# Patient Record
Sex: FEMALE | Race: WHITE | NOT HISPANIC OR LATINO | Employment: OTHER | URBAN - NONMETROPOLITAN AREA
[De-identification: names, ages, dates, MRNs, and addresses within clinical notes are randomized per-mention and may not be internally consistent; named-entity substitution may affect disease eponyms.]

---

## 2023-09-18 LAB
BASOPHILS (10*3/UL) IN BLOOD BY AUTOMATED COUNT: 0.05 X10E9/L (ref 0–0.1)
BASOPHILS/100 LEUKOCYTES IN BLOOD BY AUTOMATED COUNT: 1 % (ref 0–2)
EOSINOPHILS (10*3/UL) IN BLOOD BY AUTOMATED COUNT: 0.12 X10E9/L (ref 0–0.7)
EOSINOPHILS/100 LEUKOCYTES IN BLOOD BY AUTOMATED COUNT: 2.5 % (ref 0–6)
ERYTHROCYTE DISTRIBUTION WIDTH (RATIO) BY AUTOMATED COUNT: 12.2 % (ref 11.5–14.5)
ERYTHROCYTE MEAN CORPUSCULAR HEMOGLOBIN CONCENTRATION (G/DL) BY AUTOMATED: 32.6 G/DL (ref 32–36)
ERYTHROCYTE MEAN CORPUSCULAR VOLUME (FL) BY AUTOMATED COUNT: 88 FL (ref 80–100)
ERYTHROCYTES (10*6/UL) IN BLOOD BY AUTOMATED COUNT: 4.91 X10E12/L (ref 4–5.2)
HEMATOCRIT (%) IN BLOOD BY AUTOMATED COUNT: 43.2 % (ref 36–46)
HEMOGLOBIN (G/DL) IN BLOOD: 14.1 G/DL (ref 12–16)
IMMATURE GRANULOCYTES/100 LEUKOCYTES IN BLOOD BY AUTOMATED COUNT: 0.2 % (ref 0–0.9)
LEUKOCYTES (10*3/UL) IN BLOOD BY AUTOMATED COUNT: 4.8 X10E9/L (ref 4.4–11.3)
LYMPHOCYTES (10*3/UL) IN BLOOD BY AUTOMATED COUNT: 1.28 X10E9/L (ref 1.2–4.8)
LYMPHOCYTES/100 LEUKOCYTES IN BLOOD BY AUTOMATED COUNT: 26.4 % (ref 13–44)
MONOCYTES (10*3/UL) IN BLOOD BY AUTOMATED COUNT: 0.31 X10E9/L (ref 0.1–1)
MONOCYTES/100 LEUKOCYTES IN BLOOD BY AUTOMATED COUNT: 6.4 % (ref 2–10)
NEUTROPHILS (10*3/UL) IN BLOOD BY AUTOMATED COUNT: 3.07 X10E9/L (ref 1.2–7.7)
NEUTROPHILS/100 LEUKOCYTES IN BLOOD BY AUTOMATED COUNT: 63.5 % (ref 40–80)
PLATELETS (10*3/UL) IN BLOOD AUTOMATED COUNT: 169 X10E9/L (ref 150–450)

## 2023-09-19 LAB
COBALAMIN (VITAMIN B12) (PG/ML) IN SER/PLAS: 367 PG/ML (ref 211–911)
FOLATE (NG/ML) IN SER/PLAS: 14.4 NG/ML

## 2023-12-07 PROBLEM — D22.5 MELANOCYTIC NEVI OF TRUNK: Status: ACTIVE | Noted: 2019-12-04

## 2023-12-07 PROBLEM — S62.102A LEFT WRIST FRACTURE: Status: ACTIVE | Noted: 2023-12-07

## 2023-12-07 PROBLEM — R10.13 DYSPEPSIA: Status: ACTIVE | Noted: 2023-12-07

## 2023-12-07 PROBLEM — F41.9 ANXIETY: Status: ACTIVE | Noted: 2023-12-07

## 2023-12-07 PROBLEM — L82.1 OTHER SEBORRHEIC KERATOSIS: Status: ACTIVE | Noted: 2019-12-04

## 2023-12-07 PROBLEM — L40.0 PSORIASIS VULGARIS: Status: ACTIVE | Noted: 2019-12-04

## 2023-12-07 PROBLEM — D22.70 MELANOCYTIC NEVI OF UNSPECIFIED LOWER LIMB, INCLUDING HIP: Status: ACTIVE | Noted: 2019-12-04

## 2023-12-07 PROBLEM — R92.8 ABNORMAL MAMMOGRAM: Status: ACTIVE | Noted: 2023-12-07

## 2023-12-07 PROBLEM — S69.92XA INJURY OF LEFT WRIST: Status: ACTIVE | Noted: 2023-12-07

## 2023-12-07 PROBLEM — E78.5 DYSLIPIDEMIA: Status: ACTIVE | Noted: 2023-12-07

## 2023-12-07 PROBLEM — M54.2 NECK PAIN: Status: ACTIVE | Noted: 2023-12-07

## 2023-12-07 PROBLEM — K80.50 BILIARY COLIC: Status: ACTIVE | Noted: 2023-12-07

## 2023-12-07 PROBLEM — F41.8 DEPRESSION WITH ANXIETY: Status: ACTIVE | Noted: 2023-12-07

## 2023-12-07 PROBLEM — M18.12 ARTHRITIS OF CARPOMETACARPAL (CMC) JOINT OF LEFT THUMB: Status: ACTIVE | Noted: 2023-12-07

## 2023-12-07 PROBLEM — D18.01 HEMANGIOMA OF SKIN AND SUBCUTANEOUS TISSUE: Status: ACTIVE | Noted: 2019-12-04

## 2023-12-07 PROBLEM — D22.60 MELANOCYTIC NEVI OF UNSPECIFIED UPPER LIMB, INCLUDING SHOULDER: Status: ACTIVE | Noted: 2019-12-04

## 2023-12-07 PROBLEM — R76.8 ANA POSITIVE: Status: ACTIVE | Noted: 2023-12-07

## 2023-12-07 PROBLEM — K21.9 GASTROESOPHAGEAL REFLUX DISEASE: Status: ACTIVE | Noted: 2023-12-07

## 2023-12-07 PROBLEM — L91.8 OTHER HYPERTROPHIC DISORDERS OF THE SKIN: Status: ACTIVE | Noted: 2019-12-04

## 2023-12-07 PROBLEM — R53.83 FATIGUE: Status: ACTIVE | Noted: 2023-12-07

## 2023-12-07 PROBLEM — D72.819 LEUKOPENIA: Status: ACTIVE | Noted: 2023-12-07

## 2023-12-07 PROBLEM — L40.9 PSORIASIS: Status: ACTIVE | Noted: 2023-12-07

## 2023-12-07 PROBLEM — G47.33 OSA (OBSTRUCTIVE SLEEP APNEA): Status: ACTIVE | Noted: 2023-12-07

## 2023-12-07 PROBLEM — E03.9 PRIMARY HYPOTHYROIDISM: Status: ACTIVE | Noted: 2023-12-07

## 2023-12-07 PROBLEM — M54.50 LUMBAGO: Status: ACTIVE | Noted: 2023-12-07

## 2023-12-07 PROBLEM — E53.8 LOW SERUM VITAMIN B12: Status: ACTIVE | Noted: 2023-12-07

## 2023-12-07 PROBLEM — L81.4 OTHER MELANIN HYPERPIGMENTATION: Status: ACTIVE | Noted: 2019-12-04

## 2023-12-07 PROBLEM — R50.9 FEVER AND CHILLS: Status: ACTIVE | Noted: 2023-12-07

## 2023-12-07 PROBLEM — R19.8 GI SYMPTOMS: Status: ACTIVE | Noted: 2023-12-07

## 2023-12-07 PROBLEM — E55.9 VITAMIN D DEFICIENCY: Status: ACTIVE | Noted: 2023-12-07

## 2023-12-07 PROBLEM — M25.532 LEFT WRIST PAIN: Status: ACTIVE | Noted: 2023-12-07

## 2023-12-07 PROBLEM — M25.512 ACUTE PAIN OF LEFT SHOULDER: Status: ACTIVE | Noted: 2023-12-07

## 2023-12-07 PROBLEM — L40.50 PSORIATIC ARTHROPATHY (MULTI): Status: ACTIVE | Noted: 2023-12-07

## 2023-12-07 PROBLEM — J45.909 ASTHMA (HHS-HCC): Status: ACTIVE | Noted: 2023-12-07

## 2023-12-07 PROBLEM — J45.901 ASTHMA EXACERBATION (HHS-HCC): Status: ACTIVE | Noted: 2023-12-07

## 2023-12-07 PROBLEM — D48.5 NEOPLASM OF UNCERTAIN BEHAVIOR OF SKIN: Status: ACTIVE | Noted: 2019-12-04

## 2023-12-07 RX ORDER — ASPIRIN 325 MG
1 TABLET, DELAYED RELEASE (ENTERIC COATED) ORAL
COMMUNITY
Start: 2021-02-11

## 2023-12-07 RX ORDER — ASPIRIN 81 MG/1
81 TABLET ORAL DAILY
COMMUNITY
Start: 2013-10-09

## 2023-12-07 RX ORDER — CLOBETASOL PROPIONATE 0.46 MG/ML
SOLUTION TOPICAL
COMMUNITY
Start: 2019-06-24

## 2023-12-07 RX ORDER — OMEPRAZOLE 40 MG/1
40 CAPSULE, DELAYED RELEASE ORAL DAILY
COMMUNITY
Start: 2021-04-26

## 2023-12-07 RX ORDER — ESCITALOPRAM OXALATE 5 MG/1
5 TABLET ORAL DAILY
COMMUNITY
Start: 2021-04-26

## 2023-12-07 RX ORDER — CALCIPOTRIENE 50 UG/G
CREAM TOPICAL
COMMUNITY
Start: 2019-02-18

## 2023-12-07 RX ORDER — LEVOTHYROXINE SODIUM 25 UG/1
25 TABLET ORAL
COMMUNITY
Start: 2018-02-19

## 2023-12-07 RX ORDER — TRIAMCINOLONE ACETONIDE 1 MG/G
CREAM TOPICAL
COMMUNITY
Start: 2019-01-21

## 2023-12-07 RX ORDER — BUDESONIDE AND FORMOTEROL FUMARATE DIHYDRATE 80; 4.5 UG/1; UG/1
2 AEROSOL RESPIRATORY (INHALATION) 2 TIMES DAILY
COMMUNITY
Start: 2020-02-20

## 2023-12-07 RX ORDER — DESONIDE 0.5 MG/G
CREAM TOPICAL
COMMUNITY
Start: 2019-06-24

## 2023-12-07 RX ORDER — FLUOCINONIDE 0.5 MG/G
CREAM TOPICAL
COMMUNITY
Start: 2019-01-21

## 2024-01-10 ENCOUNTER — TELEPHONE (OUTPATIENT)
Dept: HEMATOLOGY/ONCOLOGY | Facility: HOSPITAL | Age: 69
End: 2024-01-10
Payer: MEDICARE

## 2024-01-10 NOTE — TELEPHONE ENCOUNTER
Attempted to reach patient regarding her upcoming appointment with Dr. Gibbs. Left message for patient to call back at her earliest convenience.

## 2024-01-10 NOTE — TELEPHONE ENCOUNTER
Patient called back regarding the message left for her. Patient is aware the provider is leaving and is agreeable to transferring care to RIVERA Briseno. Scheduled patient for March 18th, @ 10:00 am. Patient verbalized and agreed to appointment.

## 2024-03-15 ENCOUNTER — TELEPHONE (OUTPATIENT)
Dept: HEMATOLOGY/ONCOLOGY | Facility: HOSPITAL | Age: 69
End: 2024-03-15
Payer: MEDICARE

## 2024-03-15 NOTE — TELEPHONE ENCOUNTER
Spoke with Paulette and she agreed to come in on Saturday 3/16/24 to get her labs drawn prior to her appointment with JENNIFER Rivera NP on 3/18/24.

## 2024-03-16 ENCOUNTER — LAB (OUTPATIENT)
Dept: LAB | Facility: LAB | Age: 69
End: 2024-03-16
Payer: MEDICARE

## 2024-03-16 DIAGNOSIS — D72.819 LEUKOPENIA, UNSPECIFIED TYPE: ICD-10-CM

## 2024-03-16 LAB
ALBUMIN SERPL BCP-MCNC: 4.3 G/DL (ref 3.4–5)
ALP SERPL-CCNC: 56 U/L (ref 33–136)
ALT SERPL W P-5'-P-CCNC: 12 U/L (ref 7–45)
ANION GAP SERPL CALC-SCNC: 9 MMOL/L (ref 10–20)
AST SERPL W P-5'-P-CCNC: 16 U/L (ref 9–39)
BASOPHILS # BLD AUTO: 0.04 X10*3/UL (ref 0–0.1)
BASOPHILS NFR BLD AUTO: 0.9 %
BILIRUB SERPL-MCNC: 0.6 MG/DL (ref 0–1.2)
BUN SERPL-MCNC: 14 MG/DL (ref 6–23)
CALCIUM SERPL-MCNC: 9.5 MG/DL (ref 8.6–10.3)
CHLORIDE SERPL-SCNC: 105 MMOL/L (ref 98–107)
CO2 SERPL-SCNC: 30 MMOL/L (ref 21–32)
CREAT SERPL-MCNC: 0.89 MG/DL (ref 0.5–1.05)
EGFRCR SERPLBLD CKD-EPI 2021: 70 ML/MIN/1.73M*2
EOSINOPHIL # BLD AUTO: 0.1 X10*3/UL (ref 0–0.7)
EOSINOPHIL NFR BLD AUTO: 2.3 %
ERYTHROCYTE [DISTWIDTH] IN BLOOD BY AUTOMATED COUNT: 12.1 % (ref 11.5–14.5)
FERRITIN SERPL-MCNC: 81 NG/ML (ref 8–150)
FOLATE SERPL-MCNC: 21.5 NG/ML
GLUCOSE SERPL-MCNC: 71 MG/DL (ref 74–99)
HCT VFR BLD AUTO: 42.6 % (ref 36–46)
HGB BLD-MCNC: 13.9 G/DL (ref 12–16)
HGB RETIC QN: 32 PG (ref 28–38)
IMM GRANULOCYTES # BLD AUTO: 0.01 X10*3/UL (ref 0–0.7)
IMM GRANULOCYTES NFR BLD AUTO: 0.2 % (ref 0–0.9)
IMMATURE RETIC FRACTION: 3.3 %
IRON SATN MFR SERPL: 19 % (ref 25–45)
IRON SERPL-MCNC: 60 UG/DL (ref 35–150)
LYMPHOCYTES # BLD AUTO: 1.34 X10*3/UL (ref 1.2–4.8)
LYMPHOCYTES NFR BLD AUTO: 30.9 %
MCH RBC QN AUTO: 28 PG (ref 26–34)
MCHC RBC AUTO-ENTMCNC: 32.6 G/DL (ref 32–36)
MCV RBC AUTO: 86 FL (ref 80–100)
MONOCYTES # BLD AUTO: 0.28 X10*3/UL (ref 0.1–1)
MONOCYTES NFR BLD AUTO: 6.5 %
NEUTROPHILS # BLD AUTO: 2.57 X10*3/UL (ref 1.2–7.7)
NEUTROPHILS NFR BLD AUTO: 59.2 %
NRBC BLD-RTO: 0 /100 WBCS (ref 0–0)
PLATELET # BLD AUTO: 164 X10*3/UL (ref 150–450)
POTASSIUM SERPL-SCNC: 3.9 MMOL/L (ref 3.5–5.3)
PROT SERPL-MCNC: 6.7 G/DL (ref 6.4–8.2)
RBC # BLD AUTO: 4.97 X10*6/UL (ref 4–5.2)
RETICS #: 0.07 X10*6/UL (ref 0.02–0.11)
RETICS/RBC NFR AUTO: 1.3 % (ref 0.5–2)
SODIUM SERPL-SCNC: 140 MMOL/L (ref 136–145)
TIBC SERPL-MCNC: 319 UG/DL (ref 240–445)
UIBC SERPL-MCNC: 259 UG/DL (ref 110–370)
VIT B12 SERPL-MCNC: 437 PG/ML (ref 211–911)
WBC # BLD AUTO: 4.3 X10*3/UL (ref 4.4–11.3)

## 2024-03-16 PROCEDURE — 36415 COLL VENOUS BLD VENIPUNCTURE: CPT

## 2024-03-16 PROCEDURE — 82607 VITAMIN B-12: CPT

## 2024-03-16 PROCEDURE — 82746 ASSAY OF FOLIC ACID SERUM: CPT

## 2024-03-17 NOTE — PROGRESS NOTES
[facility]    PATIENT VISIT INFORMATION    Visit Type: Follow-up visit new provider    Referring Provider: [facility]  Reason for referral: Leukopenia and thrombocytopenia  Primary Practice Provider:Ольга Duvall MD    HEMATOLOGY HISTORY    68 -year old white female referred for leukopenia and intermittent thrombocytopenia  possibly immune mediated due to psoriasis and borderline B12 deficiency,  6/2021 CBC actually within normal limits, 7/2021 stable mild  splenomegaly dating back to 2016 likely not significant.  Of note, she was seen by my colleague Dr. Real Agrawal 5/2017  for a polypectomy with less than 1 mm focus of neuroendocrine cells possible carcinoid but CT unremarkable, noted to have chronic left lower quadrant pain at that time, recommended surveillance colonoscopy in 1 year , had a colonoscopy 12/2017 showing diverticulosis recommending follow-up in 10 years.   Dr. Hernandez felt this could be immune mediated leukopenia and also note B12 deficiency.  Evaluation prior to 21 showed negative HIV and hepatitis.  Negative rheumatoid factor SPEP LDH.  Normal TSH.  No medications contributing.  No alcohol or liver disease.  HISTORY OF PRESENT ILLNESS     ID Statement: Yamileth Amin is a 68 year old female     Chief Complaint: Cytopenia     Interval History:   Patient presents for follow-up with new provider.  She does note that she has FLORECITA and wears a CPAP.  2016 she went through menopause and is requesting a referral for a Pap smear since her last was not within screening interval timeframe.  She does state that she had an RSV injection in her right arm and she has been having pain the last 3 weeks though it is improving.  She does endorse fatigue.    No fevers, drenching sweats, unintentional weight loss, lymphadenopathy, recent or recurrent infections except as above, unusual headaches, sore throat, acute vision changes now, chest pain now,  edema, shortness of breath or cough now except occasional cough from allergies, nausea or vomiting, abdominal pain, blood in stool, dysuria or hematuria, pain except as above, numbness except in one of her toes which she says podiatry felt was from aging in 2022 with symptoms stable, focal weakness, tingling, abnormal bruising or bleeding, or diabetes.   PAST/CURRENT HISTORY     MEDICAL/SURGICAL HISTORY  -Leukopenia/thrombocytopenia  and B12 deficiency as above   -COVID April 2023 treated as outpatient with Paxlovid  -polyp with neuroendocrine cells as above, 3/2015 splenic flexure polyp with tubular adenoma  -12/2017 gastric biopsy with reactive gastropathy .  EGD showing normal esophagus and gastritis.  -Chronic abdominal, biliary colic, GERD-reportedly better with PPI, EGD 2017 as  above, CT 2017 as above, normal HIDA scan 2019  -PCP recommended omeprazole 4/2021 but patient decided not to start that.  -Chronic constipation   -Restless leg syndrome/FLORECITA  -Splenomegaly  -Asthma  -Cervix dysplasia  -Mitral valve prolapse and tricuspid valve abnormalities.  She reported 2021 stress test okay, mitral valve prolapse and tricuspid valve abnormalities better-she saw cardiology December 2022 but I do not have that note, no changes.    -TMJ   -Chronic low back and neck pain  -Psoriasis without psoriatic arthritis .  Patient was seen by rheumatology 2016 and 2019 felt not to have an inflammatory arthritis.  Psoriasis was treated with Otezla but had GI symptoms subsequently managed with topicals.   -Raynoud's syndrome  -Anxiety, panic attacks, depression.  PCP recommended Lexapro/2021 but patient decided not to take that.  -FLORECITA.  Still had sleep apnea 6/2021.  -Positive SUJIT  -Dyslipidemia  -Wrist fracture  12/2020  -B12 deficiency treated with B12 injections 2018/2019 for 6 months   -Osteoarthritis   -Osteoporosis mentioned in a dermatology note but patient denied.  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 [employer]   -Tobacco/smokeless use: Quit smoking at age 34, smoked 5-6 years.   -Alcohol: occasional  -Illicit drug or marijuana use: denies   -Druze or Spiritual beliefs: Spiritual   -Social Determinates of Health Concerns: None reported    FAMILY HISTORY  -Dad non melanoma skin cancer, heart surgery  -Siblings brother prostate cancer under treatment he is 68 y/o  -Maternal Aunt  70 y/o from \"blood cancer\"  -No other known history of hematologic, bleeding, clotting, autoimmune, genetic, or malignant disorders in the family.     OCCUPATIONAL/ENVIRONMENTAL HISTORY/EXPOSURES:  -None reported    Active Problems, Allergy List, Medication List, and PMH/PSH/FH/Social Hx have been reviewed and reconciled in chart. Updates made when necessary.     REVIEW OF SYSTEMS   A review of systems has been completed and are negative for complaints except what is stated in the assessment, HPI, IH, ROS, and/or past medical history.    ALLERGIES AND MEDICATIONS     Allergies and Intolerances:   Allergies   Allergen Reactions    Amoxicillin Unknown    Doxycycline Unknown    Penicillins Hives    Poison Ivy Extract Dermatitis    Sulfamethoxazole-Trimethoprim Unknown      Medication Profile:   Current Outpatient Medications   Medication Instructions    ammonium lactate (Lac-Hydrin) 12 % lotion Topical, As needed    aspirin 81 mg, oral, Daily    b complex 0.4 mg tablet 1 tablet, oral, Daily    budesonide-formoteroL (Symbicort) 80-4.5 mcg/actuation inhaler 2 puffs, inhalation, 2 times daily    calcipotriene (Dovonex) 0.005 % cream Topical    cholecalciferol (Vitamin D-3) 50,000 unit capsule 1 capsule, oral, Weekly    clobetasol " (Temovate) 0.05 % external solution 1 Application    desonide (DesOwen) 0.05 % cream 1 Application    escitalopram (LEXAPRO) 5 mg, oral, Daily    fluocinonide 0.05 % cream Topical    levothyroxine (SYNTHROID, LEVOXYL) 25 mcg, oral, Daily before breakfast    omeprazole (PRILOSEC) 40 mg, oral, Daily    risankizumab-rzaa (SKYRIZI SUBQ) subcutaneous, Every 3 months    triamcinolone (Kenalog) 0.1 % cream 1 Application      Available Vaccination Record:   Immunization History   Administered Date(s) Administered    Flu vaccine (IIV4), preservative free *Check age/dose* 09/13/2017    Flu vaccine, quadrivalent, high-dose, preservative free, age 65y+ (FLUZONE) 11/16/2021, 11/11/2022    Influenza, injectable, quadrivalent 10/10/2018    Influenza, seasonal, injectable 09/29/2011, 10/09/2013, 09/16/2014    Influenza, seasonal, injectable, preservative free 10/04/2012, 11/13/2015, 08/22/2016    Influenza, trivalent, adjuvanted 10/26/2019    Moderna COVID-19 vaccine, Fall 2023, 12 yeasrs and older (50mcg/0.5mL) 11/27/2023    Moderna SARS-CoV-2 Vaccination 02/04/2021, 03/04/2021, 11/16/2021    Pfizer Gray Cap SARS-CoV-2 07/21/2022    Pneumococcal conjugate vaccine, 13-valent (PREVNAR 13) 10/26/2019    Pneumococcal polysaccharide vaccine, 23-valent, age 2 years and older (PNEUMOVAX 23) 10/09/2013, 06/09/2021    Tdap vaccine, age 7 year and older (BOOSTRIX, ADACEL) 01/31/2011, 06/09/2021    Zoster vaccine, recombinant, adult (SHINGRIX) 05/24/2021, 07/26/2021    Zoster, live 11/13/2015      PHYSICAL EXAM     Vital Signs/Measurements:       6/28/2021     9:35 AM 9/28/2021     9:08 AM 5/17/2022     9:12 AM 9/15/2022     9:42 AM 3/9/2023    11:01 AM 6/19/2023     1:45 PM 3/18/2024    10:22 AM   Vitals   Systolic 121 136 114 127 119 121 129   Diastolic 80 78 70 78 75 78 76   Heart Rate 85 78 70 84 91 79 84   Temp 35.6 °C (96.1 °F) 35.9 °C (96.6 °F) 36.3 °C (97.3 °F) 36.9 °C (98.4 °F) 35.5 °C (95.9 °F) 36.4 °C (97.5 °F) 36.5 °C (97.7 °F)  "  Resp 18 18 18 18 18 18 16   Height (in) 1.775 m (5' 9.88\") 1.775 m (5' 9.88\") 1.77 m (5' 9.69\") 1.77 m (5' 9.69\") 1.77 m (5' 9.69\") 1.753 m (5' 9\") 1.753 m (5' 9\")   Weight (lb) 168.65 167.99 173.72 169.97 171.3 169.25 183.09   BMI 24.28 kg/m2 24.19 kg/m2 25.15 kg/m2 24.61 kg/m2 24.8 kg/m2 24.99 kg/m2 27.04 kg/m2   BSA (m2) 1.94 m2 1.94 m2 1.97 m2 1.95 m2 1.95 m2 1.93 m2 2.01 m2   Visit Report       Report        Performance:   ECOG Performance Status: 0     Grade ECOG performance status   0 Fully active, able to carry on all pre-disease performance without restriction   1 Restricted in physically strenuous activity but ambulatory and able to carry out work of a light or sedentary nature, e.g., light housework, office work   2 Ambulatory and capable of all selfcare but unable to carry out any work activities; Up and about more than 50% of waking hours   3 Capable of only limited selfcare, confined to bed or chair more than 50% of waking hours   4 Completely disabled; Cannot carry out any selfcare; Totally confined to bed or chair   5 Dead     Physical Exam:  General: Patient is awake/alert/oriented x3, no distress, Nourished, hydrated, alert and cooperative, ambulating without difficulty  Skin: Expected color for ethnicity, good turgor, dry, no prominent lesions, rashes, unusual bruising, or bleeding   Hair: Normal texture and distribution   Nails: Normal color, no deformities    HEENT:   Head: Normocephalic, atraumatic, no visible or palpable masses, depressions, or scarring   Eyes: Conjunctiva clear, sclera non-icteric, PERRL, EOMI, no exudates or hemorrhages   Ears: nl appearance, hearing intact    Nose: no external lesions, mucosa non-inflamed, no rhinorrhea   Mouth: Mucous membranes moist, no lesions, sores, bleeding, or erythema     Head/Neck: Neck supple, no apparent injury, thyroid without mass or tenderness, No JVD, trachea midline, no bruits appreciated    Respiratory/Thorax: Patent airways, CTAB, chest " symmetry, normal inspiratory and expiratory effort    Cardiovascular: Regular rate and rhythm, no murmur or gallop, no carotid bruit or thrills    Gastrointestinal: Bowel sounds normal, non-distended, soft, no tenderness, no masses or hernia, or organomegaly appreciated    Genitourinary: deferred   Musculoskeletal: Normal gait, normal range of motion, no pain on palpation of spine, no deformity, normal strength for baseline, no atrophy, and no CVA tenderness appreciated   Extremities: No amputations or deformities, cyanosis, edema or viscosities, peripheral pulses intact   Neurological: Sensation present to touch, intact senses, motor response and reflexes normal, normal strength   Breast:    Lymphatic: No significant lymphadenopathy   Psychological: Intact recent and remote memory, judgement, and insight. Appropriate mood, affect, and behavior          RESULTS/DATA     Labs:     Lab Results   Component Value Date    WBC 4.3 (L) 03/16/2024    NEUTROABS 2.57 03/16/2024    IGABSOL 0.01 03/16/2024    LYMPHSABS 1.34 03/16/2024    MONOSABS 0.28 03/16/2024    EOSABS 0.10 03/16/2024    BASOSABS 0.04 03/16/2024    RBC 4.97 03/16/2024    MCV 86 03/16/2024    MCHC 32.6 03/16/2024    HGB 13.9 03/16/2024    HCT 42.6 03/16/2024     03/16/2024     Lab Results   Component Value Date    RETICCTPCT 1.3 03/16/2024      Lab Results   Component Value Date    CREATININE 0.89 03/16/2024    BUN 14 03/16/2024    EGFR 70 03/16/2024     03/16/2024    K 3.9 03/16/2024     03/16/2024    CO2 30 03/16/2024      Lab Results   Component Value Date    ALT 12 03/16/2024    AST 16 03/16/2024    ALKPHOS 56 03/16/2024    BILITOT 0.6 03/16/2024      Lab Results   Component Value Date    TSH 2.08 02/03/2021     Lab Results   Component Value Date    TSH 2.08 02/03/2021     Lab Results   Component Value Date    IRON 60 03/16/2024    TIBC 319 03/16/2024    FERRITIN 81 03/16/2024      Lab Results   Component Value Date    SANJVDTE87 437  03/16/2024      Lab Results   Component Value Date    FOLATE 21.5 03/16/2024     Lab Results   Component Value Date    SEDRATE 4 07/23/2019      Lab Results   Component Value Date    CRP 0.14 07/23/2019         Radiology/Studies:   NA    ASSESSMENT/PLAN     Assessment and Plan:   #1. Leukopenia without significant neutropenia with WBC count 4s in 2009 and 3s in 2013, previously seen by another hematologist who felt that this was benign chronic leukopenia without clinical consequences possibly also secondary to mild splenomegaly, positive SUJIT  without inflammatory arthritis by evaluation by rheumatology although psoriasis so this could be immune mediated leukopenia, also history  of B12 deficiency which could be contributing to her abnormal CBC, cannot rule out a bone marrow disorder.  Evaluation prior to 2021 showed negative/normal HIV, hepatitis, rheumatoid factor, SPEP, LDH.  TSH normal 2021.  No significant medications contributing  to her abnormal CBC.  No significant alcohol use or known liver disease.  6/2021 actually normal CBC although WBC borderline at 4.5 and normal/negative zinc, B6, B1, folate, iron studies, copper, peripheral blood flow cytometry.  7/2021 abdominal ultrasound  with normal liver, mild splenomegaly outlined below. 9/28/21 CBC actually normal, hemoglobin actually on the upper limit of normal side, keep in mind could be due to her sleep apnea.  Negative hepatitis panel 2019.    CBC within normal limits 9/2022 and again 3/2023.  3/18/2024 normal CBC other than a WBC of 4.3.  Overall, stable labs today.  Patient will call if any worsening signs and symptoms occur.  She does note that she uses CPAP every night but does remove it at times.    #2. Mild intermittent thrombocytopenia but other times normal, greater than 100, similar differential diagnosis as above.  Platelets 164 3/18/2024.  Ultrasound 2016 revealed splenomegaly but not on CT in 2017.  She does endorse chronic fatigue.    #3.  History of B12 deficiency treated with B12 injections for 6 months 2018/2019, normal level 2/2021. I recommended B12 pills 6/2021 but she did not start that until May 2022 at which time her B12 level was 300s, July 2022 normal methylmalonic acid and B12 on oral supplements. Negative intrinsic factor and parietal cell antibody 9/2022.  3/18/2024 reveals normal B12 437 and folate 21.5, iron 19 TSAT and ferritin 81.     **Please follow with specialties as scheduled for other comorbidities and routine health screenings.**    I have reviewed the patient's medical record including provider notes, laboratory and testing results, imaging, and procedures available within the system and outside the system pertinent to patient care.     Follow up:    RTC:  -6 months with labs 1 week prior    Medications:  -N/A    Imaging/Testing:  -N/A    Referral:  -Gynecology per patient request    Other Pertinent Appointments:  -Family medicine 4/2/2024      Patient Discussion Summary:  Discussed plan of care in detail. Patient states understanding and in agreement. Answered all questions. They will call with any additional questions and/or concerns.    Thank you for allowing me to participate in your care. It was a pleasure meeting you.    Sincerely,  Melba Rivera, APRN-CNP       This document may have been written by voice recognition software.  Please request clarification if there is documentation error or clarification is needed.   Time based billing: Please see documentation within this specific encounter.

## 2024-03-18 ENCOUNTER — APPOINTMENT (OUTPATIENT)
Dept: HEMATOLOGY/ONCOLOGY | Facility: HOSPITAL | Age: 69
End: 2024-03-18
Payer: MEDICARE

## 2024-03-18 ENCOUNTER — TELEPHONE (OUTPATIENT)
Dept: HEMATOLOGY/ONCOLOGY | Facility: HOSPITAL | Age: 69
End: 2024-03-18
Payer: MEDICARE

## 2024-03-18 ENCOUNTER — OFFICE VISIT (OUTPATIENT)
Dept: HEMATOLOGY/ONCOLOGY | Facility: HOSPITAL | Age: 69
End: 2024-03-18
Payer: MEDICARE

## 2024-03-18 VITALS
OXYGEN SATURATION: 100 % | HEIGHT: 69 IN | WEIGHT: 183.09 LBS | BODY MASS INDEX: 27.12 KG/M2 | DIASTOLIC BLOOD PRESSURE: 76 MMHG | SYSTOLIC BLOOD PRESSURE: 129 MMHG | TEMPERATURE: 97.7 F | RESPIRATION RATE: 16 BRPM | HEART RATE: 84 BPM

## 2024-03-18 DIAGNOSIS — D72.819 LEUKOPENIA, UNSPECIFIED TYPE: Primary | ICD-10-CM

## 2024-03-18 PROCEDURE — 99214 OFFICE O/P EST MOD 30 MIN: CPT

## 2024-03-18 PROCEDURE — 1159F MED LIST DOCD IN RCRD: CPT

## 2024-03-18 PROCEDURE — 1125F AMNT PAIN NOTED PAIN PRSNT: CPT

## 2024-03-18 RX ORDER — AMMONIUM LACTATE 12 G/100G
LOTION TOPICAL AS NEEDED
COMMUNITY

## 2024-03-18 RX ORDER — MULTIVITAMIN/IRON/FOLIC ACID 18MG-0.4MG
1 TABLET ORAL DAILY
COMMUNITY

## 2024-03-18 ASSESSMENT — PATIENT HEALTH QUESTIONNAIRE - PHQ9
SUM OF ALL RESPONSES TO PHQ9 QUESTIONS 1 AND 2: 0
2. FEELING DOWN, DEPRESSED OR HOPELESS: NOT AT ALL
1. LITTLE INTEREST OR PLEASURE IN DOING THINGS: NOT AT ALL

## 2024-03-18 ASSESSMENT — ENCOUNTER SYMPTOMS
OCCASIONAL FEELINGS OF UNSTEADINESS: 1
LOSS OF SENSATION IN FEET: 1
DEPRESSION: 0

## 2024-03-18 ASSESSMENT — COLUMBIA-SUICIDE SEVERITY RATING SCALE - C-SSRS
2. HAVE YOU ACTUALLY HAD ANY THOUGHTS OF KILLING YOURSELF?: NO
1. IN THE PAST MONTH, HAVE YOU WISHED YOU WERE DEAD OR WISHED YOU COULD GO TO SLEEP AND NOT WAKE UP?: NO
6. HAVE YOU EVER DONE ANYTHING, STARTED TO DO ANYTHING, OR PREPARED TO DO ANYTHING TO END YOUR LIFE?: NO

## 2024-03-18 ASSESSMENT — PAIN SCALES - GENERAL: PAINLEVEL: 4

## 2024-03-18 NOTE — TELEPHONE ENCOUNTER
Patient returned phone call to schedule 6 month follow up. Informed patient of labs needed prior to appointment. Patient verbalized understanding and states she will get labs drawn a week prior to scheduled appointment. Patient scheduled for Tuesday, 9/24/24 at 8:30 AM. Patient verbalized understanding and agreed to plan of care/appointment.

## 2024-03-18 NOTE — TELEPHONE ENCOUNTER
Patient was seen in office today for HEM ONC ESTABLISHED PT NEW PHYSICIAN visit with RIVERA Jovel. Patient is to RTC in 6 months, on or around 9/24/24, with labs done prior. Patient left before being scheduled for next visit. Attempted to reach patient to schedule. Left detailed message, with call back number, for patient to call back at earliest convenience.

## 2024-03-19 ENCOUNTER — TELEPHONE (OUTPATIENT)
Dept: HEMATOLOGY/ONCOLOGY | Facility: HOSPITAL | Age: 69
End: 2024-03-19
Payer: MEDICARE

## 2024-03-19 NOTE — TELEPHONE ENCOUNTER
----- Message from RIVERA Lopez sent at 3/18/2024  9:31 PM EDT -----  Carrillo Keith, recommend this patient to take B12 supplement 500 to 1000 mcg/day and oral iron every other day, she can purchase a lower dose over-the-counter.  [employer] also makes iron Gummies with vitamin C.  ----- Message -----  From: Lab, Background User  Sent: 3/16/2024  12:09 PM EDT  To: RIVERA Lopez

## 2024-09-17 ENCOUNTER — LAB (OUTPATIENT)
Dept: LAB | Facility: LAB | Age: 69
End: 2024-09-17
Payer: MEDICARE

## 2024-09-17 DIAGNOSIS — E53.8 DEFICIENCY OF OTHER SPECIFIED B GROUP VITAMINS: ICD-10-CM

## 2024-09-17 DIAGNOSIS — D72.819 LEUKOPENIA, UNSPECIFIED TYPE: ICD-10-CM

## 2024-09-17 LAB
ALBUMIN SERPL BCP-MCNC: 4.3 G/DL (ref 3.4–5)
ALP SERPL-CCNC: 62 U/L (ref 33–136)
ALT SERPL W P-5'-P-CCNC: 9 U/L (ref 7–45)
ANION GAP SERPL CALC-SCNC: 9 MMOL/L (ref 10–20)
AST SERPL W P-5'-P-CCNC: 14 U/L (ref 9–39)
BASOPHILS # BLD AUTO: 0.04 X10*3/UL (ref 0–0.1)
BASOPHILS NFR BLD AUTO: 0.9 %
BILIRUB SERPL-MCNC: 0.7 MG/DL (ref 0–1.2)
BUN SERPL-MCNC: 16 MG/DL (ref 6–23)
CALCIUM SERPL-MCNC: 9.5 MG/DL (ref 8.6–10.3)
CHLORIDE SERPL-SCNC: 104 MMOL/L (ref 98–107)
CO2 SERPL-SCNC: 31 MMOL/L (ref 21–32)
CREAT SERPL-MCNC: 0.86 MG/DL (ref 0.5–1.05)
EGFRCR SERPLBLD CKD-EPI 2021: 73 ML/MIN/1.73M*2
EOSINOPHIL # BLD AUTO: 0.11 X10*3/UL (ref 0–0.7)
EOSINOPHIL NFR BLD AUTO: 2.6 %
ERYTHROCYTE [DISTWIDTH] IN BLOOD BY AUTOMATED COUNT: 12.2 % (ref 11.5–14.5)
FERRITIN SERPL-MCNC: 152 NG/ML (ref 8–150)
GLUCOSE SERPL-MCNC: 99 MG/DL (ref 74–99)
HCT VFR BLD AUTO: 44 % (ref 36–46)
HGB BLD-MCNC: 14.2 G/DL (ref 12–16)
IMM GRANULOCYTES # BLD AUTO: 0.01 X10*3/UL (ref 0–0.7)
IMM GRANULOCYTES NFR BLD AUTO: 0.2 % (ref 0–0.9)
IRON SATN MFR SERPL: 25 % (ref 25–45)
IRON SERPL-MCNC: 76 UG/DL (ref 35–150)
LDH SERPL L TO P-CCNC: 141 U/L (ref 84–246)
LYMPHOCYTES # BLD AUTO: 1.21 X10*3/UL (ref 1.2–4.8)
LYMPHOCYTES NFR BLD AUTO: 28.4 %
MCH RBC QN AUTO: 28.5 PG (ref 26–34)
MCHC RBC AUTO-ENTMCNC: 32.3 G/DL (ref 32–36)
MCV RBC AUTO: 88 FL (ref 80–100)
MONOCYTES # BLD AUTO: 0.32 X10*3/UL (ref 0.1–1)
MONOCYTES NFR BLD AUTO: 7.5 %
NEUTROPHILS # BLD AUTO: 2.57 X10*3/UL (ref 1.2–7.7)
NEUTROPHILS NFR BLD AUTO: 60.4 %
NRBC BLD-RTO: 0 /100 WBCS (ref 0–0)
PLATELET # BLD AUTO: 167 X10*3/UL (ref 150–450)
POTASSIUM SERPL-SCNC: 4.5 MMOL/L (ref 3.5–5.3)
PROT SERPL-MCNC: 6.7 G/DL (ref 6.4–8.2)
RBC # BLD AUTO: 4.99 X10*6/UL (ref 4–5.2)
SODIUM SERPL-SCNC: 139 MMOL/L (ref 136–145)
TIBC SERPL-MCNC: 299 UG/DL (ref 240–445)
UIBC SERPL-MCNC: 223 UG/DL (ref 110–370)
VIT B12 SERPL-MCNC: 512 PG/ML (ref 211–911)
WBC # BLD AUTO: 4.3 X10*3/UL (ref 4.4–11.3)

## 2024-09-17 PROCEDURE — 36415 COLL VENOUS BLD VENIPUNCTURE: CPT

## 2024-09-17 PROCEDURE — 82607 VITAMIN B-12: CPT

## 2024-09-17 PROCEDURE — 83921 ORGANIC ACID SINGLE QUANT: CPT

## 2024-09-22 LAB — METHYLMALONATE SERPL-SCNC: 0.12 UMOL/L (ref 0–0.4)

## 2024-09-24 ENCOUNTER — OFFICE VISIT (OUTPATIENT)
Dept: HEMATOLOGY/ONCOLOGY | Facility: HOSPITAL | Age: 69
End: 2024-09-24
Payer: MEDICARE

## 2024-09-24 VITALS
OXYGEN SATURATION: 97 % | TEMPERATURE: 97.2 F | RESPIRATION RATE: 16 BRPM | SYSTOLIC BLOOD PRESSURE: 137 MMHG | HEIGHT: 69 IN | WEIGHT: 172.4 LBS | BODY MASS INDEX: 25.53 KG/M2 | HEART RATE: 72 BPM | DIASTOLIC BLOOD PRESSURE: 83 MMHG

## 2024-09-24 DIAGNOSIS — D72.819 LEUKOPENIA, UNSPECIFIED TYPE: ICD-10-CM

## 2024-09-24 DIAGNOSIS — E61.1 IRON DEFICIENCY: Primary | ICD-10-CM

## 2024-09-24 PROCEDURE — 1126F AMNT PAIN NOTED NONE PRSNT: CPT

## 2024-09-24 PROCEDURE — 99214 OFFICE O/P EST MOD 30 MIN: CPT

## 2024-09-24 PROCEDURE — 3008F BODY MASS INDEX DOCD: CPT

## 2024-09-24 PROCEDURE — 1159F MED LIST DOCD IN RCRD: CPT

## 2024-09-24 PROCEDURE — 1160F RVW MEDS BY RX/DR IN RCRD: CPT

## 2024-09-24 RX ORDER — ASCORBIC ACID 500 MG
500 TABLET ORAL EVERY OTHER DAY
COMMUNITY

## 2024-09-24 RX ORDER — CALCIUM CARBONATE 300MG(750)
400 TABLET,CHEWABLE ORAL DAILY
COMMUNITY

## 2024-09-24 RX ORDER — FERROUS SULFATE 325(65) MG
325 TABLET ORAL EVERY OTHER DAY
COMMUNITY

## 2024-09-24 RX ORDER — LANOLIN ALCOHOL/MO/W.PET/CERES
1000 CREAM (GRAM) TOPICAL DAILY
COMMUNITY

## 2024-09-24 ASSESSMENT — COLUMBIA-SUICIDE SEVERITY RATING SCALE - C-SSRS
1. IN THE PAST MONTH, HAVE YOU WISHED YOU WERE DEAD OR WISHED YOU COULD GO TO SLEEP AND NOT WAKE UP?: NO
6. HAVE YOU EVER DONE ANYTHING, STARTED TO DO ANYTHING, OR PREPARED TO DO ANYTHING TO END YOUR LIFE?: NO
2. HAVE YOU ACTUALLY HAD ANY THOUGHTS OF KILLING YOURSELF?: NO

## 2024-09-24 ASSESSMENT — PATIENT HEALTH QUESTIONNAIRE - PHQ9
1. LITTLE INTEREST OR PLEASURE IN DOING THINGS: NOT AT ALL
SUM OF ALL RESPONSES TO PHQ9 QUESTIONS 1 AND 2: 0
2. FEELING DOWN, DEPRESSED OR HOPELESS: NOT AT ALL

## 2024-09-24 ASSESSMENT — LIFESTYLE VARIABLES
HOW MANY STANDARD DRINKS CONTAINING ALCOHOL DO YOU HAVE ON A TYPICAL DAY: 1 OR 2
AUDIT-C TOTAL SCORE: 2
HOW OFTEN DO YOU HAVE A DRINK CONTAINING ALCOHOL: 2-4 TIMES A MONTH
HOW OFTEN DO YOU HAVE SIX OR MORE DRINKS ON ONE OCCASION: NEVER
SKIP TO QUESTIONS 9-10: 1

## 2024-09-24 ASSESSMENT — PAIN SCALES - GENERAL: PAINLEVEL: 0-NO PAIN

## 2024-09-24 NOTE — PROGRESS NOTES
"[facility]    PATIENT VISIT INFORMATION    Visit Type: Follow-up visit     Referring Provider: [facility]  Reason for referral: Leukopenia and thrombocytopenia  Primary Practice Provider:Ольга Duvall MD    HEMATOLOGY HISTORY    68 -year old white female referred for leukopenia and intermittent thrombocytopenia  possibly immune mediated due to psoriasis and borderline B12 deficiency,  6/2021 CBC actually within normal limits, 7/2021 stable mild  splenomegaly dating back to 2016 likely not significant.  Of note, she was seen by my colleague Dr. Real Agrawal 5/2017  for a polypectomy with less than 1 mm focus of neuroendocrine cells possible carcinoid but CT unremarkable, noted to have chronic left lower quadrant pain at that time, recommended surveillance colonoscopy in 1 year , had a colonoscopy 12/2017 showing diverticulosis recommending follow-up in 10 years.   Dr. Hernandez felt this could be immune mediated leukopenia and also note B12 deficiency.  Evaluation prior to 21 showed negative HIV and hepatitis.  Negative rheumatoid factor SPEP LDH.  Normal TSH.  No medications contributing.  No alcohol or liver disease.  HISTORY OF PRESENT ILLNESS     ID Statement: Yamileth Amin is a 69 year old female     Chief Complaint: \"Tiered\"    Interval History:   Patient presents for follow-up. Feels tiered and forgetful sometimes. Reports Psoriasis. Starts medication skyrizi four times per year (on previously).  She does note that she has FLORECITA and wears a CPAP.  2016 she went through menopause.  New onset sweaty at night the last month. Recent UTI treated.  Appetite good. More constipated. Takes metamucil once a while.  History iron deficiency takes oral iron tolerates well.  No fevers, drenching sweats, unintentional weight loss, lymphadenopathy, recent or recurrent infections except as above, unusual headaches, sore throat, acute vision changes now, chest pain " now, edema, shortness of breath or cough now except occasional cough from allergies, nausea or vomiting, abdominal pain, blood in stool, dysuria or hematuria, pain except as above, numbness except in one of her toes which she says podiatry felt was from aging in 2022 with symptoms stable, focal weakness, tingling, abnormal bruising or bleeding, or diabetes.   PAST/CURRENT HISTORY     MEDICAL/SURGICAL HISTORY  -Leukopenia/thrombocytopenia  and B12 deficiency as above   -COVID April 2023 treated as outpatient with Paxlovid  -polyp with neuroendocrine cells as above, 3/2015 splenic flexure polyp with tubular adenoma  -12/2017 gastric biopsy with reactive gastropathy .  EGD showing normal esophagus and gastritis.  -Chronic abdominal, biliary colic, GERD-reportedly better with PPI, EGD 2017 as  above, CT 2017 as above, normal HIDA scan 2019  -PCP recommended omeprazole 4/2021 but patient decided not to start that.  -Chronic constipation   -Restless leg syndrome/FLORECITA  -Splenomegaly  -Asthma  -Cervix dysplasia  -Mitral valve prolapse and tricuspid valve abnormalities.  She reported 2021 stress test okay, mitral valve prolapse and tricuspid valve abnormalities better-she saw cardiology December 2022 but I do not have that note, no changes.    -TMJ   -Chronic low back and neck pain  -Psoriasis without psoriatic arthritis .  Patient was seen by rheumatology 2016 and 2019 felt not to have an inflammatory arthritis.  Psoriasis was treated with Otezla but had GI symptoms subsequently managed with topicals.   -Raynoud's syndrome  -Anxiety, panic attacks, depression.  PCP recommended Lexapro/2021 but patient decided not to take that.  -FLORECITA.  Still had sleep apnea 6/2021.  -Positive SUJIT  -Dyslipidemia  -Wrist fracture  12/2020  -B12 deficiency treated with B12 injections 2018/2019 for 6 months   -Osteoarthritis   -Osteoporosis mentioned in a dermatology note but patient denied.  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 [employer]   -Tobacco/smokeless use: Quit smoking at age 34, smoked 5-6 years.   -Alcohol: occasional  -Illicit drug or marijuana use: denies   -Pentecostalism or Spiritual beliefs: Spiritual   -Social Determinates of Health Concerns: None reported    FAMILY HISTORY  -Dad non melanoma skin cancer, heart surgery  -Siblings brother prostate cancer under treatment he is 66 y/o  -Maternal Aunt  70 y/o from \"blood cancer\"  -No other known history of hematologic, bleeding, clotting, autoimmune, genetic, or malignant disorders in the family.     OCCUPATIONAL/ENVIRONMENTAL HISTORY/EXPOSURES:  -None reported    Active Problems, Allergy List, Medication List, and PMH/PSH/FH/Social Hx have been reviewed and reconciled in chart. Updates made when necessary.     REVIEW OF SYSTEMS   A review of systems has been completed and are negative for complaints except what is stated in the assessment, HPI, IH, ROS, and/or past medical history.    ALLERGIES AND MEDICATIONS     Allergies and Intolerances:   Allergies   Allergen Reactions    Amoxicillin Unknown    Doxycycline Unknown    Penicillins Hives    Poison Ivy Extract Dermatitis    Sulfamethoxazole-Trimethoprim Unknown      Medication Profile:   Current Outpatient Medications   Medication Instructions    ammonium lactate (Lac-Hydrin) 12 % lotion Topical, As needed    ascorbic acid (VITAMIN C) 500 mg, oral, Every other day    b complex 0.4 mg tablet 1 tablet, oral, Daily    cholecalciferol (Vitamin D-3) 50,000 unit capsule 1 capsule, oral, Once Weekly    cyanocobalamin (VITAMIN B-12) 1,000 mcg, oral, Daily    ferrous sulfate (325 mg ferrous sulfate) 325 mg, oral, Every " "other day    levothyroxine (SYNTHROID, LEVOXYL) 25 mcg, oral, Daily before breakfast    magnesium oxide (MAG-OX) 400 mg, Daily    risankizumab-rzaa (SKYRIZI SUBQ) subcutaneous, Every 3 months      Available Vaccination Record:   Immunization History   Administered Date(s) Administered    Flu vaccine (IIV4), preservative free *Check age/dose* 09/13/2017    Flu vaccine, quadrivalent, high-dose, preservative free, age 65y+ (FLUZONE) 11/16/2021, 11/11/2022    Flu vaccine, trivalent, preservative free, age 6 months and greater (Fluarix/Fluzone/Flulaval) 10/04/2012, 11/13/2015, 08/22/2016    Influenza, injectable, quadrivalent 10/10/2018    Influenza, seasonal, injectable 09/29/2011, 10/09/2013, 09/16/2014    Influenza, trivalent, adjuvanted 10/26/2019    Moderna COVID-19 vaccine, 12 years and older (50mcg/0.5mL)(Spikevax) 11/27/2023    Moderna SARS-CoV-2 Vaccination 02/04/2021, 03/04/2021, 11/16/2021    Pfizer Gray Cap SARS-CoV-2 07/21/2022    Pneumococcal conjugate vaccine, 13-valent (PREVNAR 13) 10/26/2019    Pneumococcal polysaccharide vaccine, 23-valent, age 2 years and older (PNEUMOVAX 23) 10/09/2013, 06/09/2021    Tdap vaccine, age 7 year and older (BOOSTRIX, ADACEL) 01/31/2011, 06/09/2021    Zoster vaccine, recombinant, adult (SHINGRIX) 05/24/2021, 07/26/2021    Zoster, live 11/13/2015      PHYSICAL EXAM     Vital Signs/Measurements:       9/28/2021     9:08 AM 5/17/2022     9:12 AM 9/15/2022     9:42 AM 3/9/2023    11:01 AM 6/19/2023     1:45 PM 3/18/2024    10:22 AM 9/24/2024     9:13 AM   Vitals   Systolic 136 114 127 119 121 129 137   Diastolic 78 70 78 75 78 76 83   Heart Rate 78 70 84 91 79 84 72   Temp 35.9 °C (96.6 °F) 36.3 °C (97.3 °F) 36.9 °C (98.4 °F) 35.5 °C (95.9 °F) 36.4 °C (97.5 °F) 36.5 °C (97.7 °F) 36.2 °C (97.2 °F)   Resp 18 18 18 18 18 16 16   Height (in) 1.775 m (5' 9.88\") 1.77 m (5' 9.69\") 1.77 m (5' 9.69\") 1.77 m (5' 9.69\") 1.753 m (5' 9\") 1.753 m (5' 9\") 1.753 m (5' 9\")   Weight (lb) 167.99 " 173.72 169.97 171.3 169.25 183.09 172.4   BMI 24.19 kg/m2 25.15 kg/m2 24.61 kg/m2 24.8 kg/m2 24.99 kg/m2 27.04 kg/m2 25.46 kg/m2   BSA (m2) 1.94 m2 1.97 m2 1.95 m2 1.95 m2 1.93 m2 2.01 m2 1.95 m2   Visit Report      Report Report        Performance:   ECOG Performance Status: 0     Grade ECOG performance status   0 Fully active, able to carry on all pre-disease performance without restriction   1 Restricted in physically strenuous activity but ambulatory and able to carry out work of a light or sedentary nature, e.g., light housework, office work   2 Ambulatory and capable of all selfcare but unable to carry out any work activities; Up and about more than 50% of waking hours   3 Capable of only limited selfcare, confined to bed or chair more than 50% of waking hours   4 Completely disabled; Cannot carry out any selfcare; Totally confined to bed or chair   5 Dead     Physical Exam:  General: Patient is awake/alert/oriented x3, no distress, Nourished, hydrated, alert and cooperative, ambulating without difficulty  Skin: Expected color for ethnicity, good turgor, dry, no prominent lesions, rashes, unusual bruising, or bleeding   Hair: Normal texture and distribution   Nails: Normal color, no deformities    HEENT:   Head: Normocephalic, atraumatic, no visible masses, depressions, or scarring   Eyes: Conjunctiva clear, sclera non-icteric, PERRL, EOMI, no exudates or hemorrhages   Ears: nl appearance, hearing intact    Nose: no external lesions, mucosa non-inflamed, no rhinorrhea   Mouth: Mucous membranes moist, no lesions, sores, bleeding, or erythema     Head/Neck: Neck supple, no apparent injury, thyroid without mass or tenderness, No JVD, trachea midline, no bruits appreciated    Respiratory/Thorax: Patent airways, CTAB, chest symmetry, normal inspiratory and expiratory effort    Cardiovascular: Regular rate and rhythm, no murmur or gallop, no carotid bruit or thrills    Gastrointestinal: Bowel sounds normal,  non-distended, soft, no tenderness, no masses or hernia, or organomegaly appreciated    Genitourinary: deferred   Musculoskeletal: Normal gait, normal range of motion, no pain on palpation of spine, no deformity, normal strength for baseline, no atrophy, and no CVA tenderness appreciated   Extremities: No amputations or deformities, cyanosis, edema or viscosities, peripheral pulses intact   Neurological: Sensation present to touch, intact senses, motor response and reflexes normal, normal strength   Breast:    Lymphatic: No significant lymphadenopathy   Psychological: Intact recent and remote memory, judgement, and insight. Appropriate mood, affect, and behavior          RESULTS/DATA     Labs:     Lab Results   Component Value Date    WBC 4.3 (L) 09/17/2024    NEUTROABS 2.57 09/17/2024    IGABSOL 0.01 09/17/2024    LYMPHSABS 1.21 09/17/2024    MONOSABS 0.32 09/17/2024    EOSABS 0.11 09/17/2024    BASOSABS 0.04 09/17/2024    RBC 4.99 09/17/2024    MCV 88 09/17/2024    MCHC 32.3 09/17/2024    HGB 14.2 09/17/2024    HCT 44.0 09/17/2024     09/17/2024     Lab Results   Component Value Date    RETICCTPCT 1.3 03/16/2024      Lab Results   Component Value Date    CREATININE 0.86 09/17/2024    BUN 16 09/17/2024    EGFR 73 09/17/2024     09/17/2024    K 4.5 09/17/2024     09/17/2024    CO2 31 09/17/2024      Lab Results   Component Value Date    ALT 9 09/17/2024    AST 14 09/17/2024    ALKPHOS 62 09/17/2024    BILITOT 0.7 09/17/2024      Lab Results   Component Value Date    TSH 2.08 02/03/2021     Lab Results   Component Value Date    TSH 2.08 02/03/2021     Lab Results   Component Value Date    IRON 76 09/17/2024    TIBC 299 09/17/2024    FERRITIN 152 (H) 09/17/2024      Lab Results   Component Value Date    GPFNKOAW01 512 09/17/2024      Lab Results   Component Value Date    FOLATE 21.5 03/16/2024     Lab Results   Component Value Date    SEDRATE 4 07/23/2019      Lab Results   Component Value Date     CRP 0.14 07/23/2019         Radiology/Studies:       ASSESSMENT/PLAN     Assessment and Plan:   #1. Leukopenia without significant neutropenia with WBC count 4s in 2009 and 3s in 2013, previously seen by another hematologist who felt that this was benign chronic leukopenia without clinical consequences possibly also secondary to mild splenomegaly, positive SUJIT  without inflammatory arthritis by evaluation by rheumatology although psoriasis so this could be immune mediated leukopenia, also history  of B12 deficiency which could be contributing to her abnormal CBC, cannot rule out a bone marrow disorder.  Evaluation prior to 2021 showed negative/normal HIV, hepatitis, rheumatoid factor, SPEP, LDH.  TSH normal 2021.  No significant medications contributing  to her abnormal CBC.  No significant alcohol use or known liver disease.  6/2021 actually normal CBC although WBC borderline at 4.5 and normal/negative zinc, B6, B1, folate, iron studies, copper, peripheral blood flow cytometry.  7/2021 abdominal ultrasound  with normal liver, mild splenomegaly outlined below. 9/28/21 CBC actually normal, hemoglobin actually on the upper limit of normal side, keep in mind could be due to her sleep apnea.  Negative hepatitis panel 2019.    CBC within normal limits 9/2022 and again 3/2023.  3/18/2024 normal CBC other than a WBC of 4.3.  Overall, stable labs today.  Patient will call if any worsening signs and symptoms occur.  She does note that she uses CPAP every night but does remove it at times.  9/24/2024 slight leukopenia WBCs 4.3 differential is normal, stable and transiently noted.  Reported fatigue and sweats related to autoimmune or recent UTI.  Will monitor.     #2. Mild intermittent thrombocytopenia but other times normal, greater than 100, similar differential diagnosis as above.  Platelets 164 3/18/2024.  Ultrasound 2016 revealed splenomegaly but not on CT in 2017.  She does endorse chronic fatigue.  9/24/2024 no  thrombocytopenia noted in quite some time.    #3. History of B12 deficiency treated with B12 injections for 6 months 2018/2019, normal level 2/2021. I recommended B12 pills 6/2021 but she did not start that until May 2022 at which time her B12 level was 300s, July 2022 normal methylmalonic acid and B12 on oral supplements. Negative intrinsic factor and parietal cell antibody 9/2022.  3/18/2024 reveals normal B12 437 and folate 21.5, iron 19 TSAT and ferritin 81.  9/24/2024 iron deficiency corrected by oral iron every other day.  Takes B12 every other day which maintains B12 levels between 500-800.  Advised to reduce oral iron to only twice a week.     **Please follow with specialties as scheduled for other comorbidities and routine health screenings.**    I have reviewed the patient's medical record including provider notes, laboratory and testing results, imaging, and procedures available within the system and outside the system pertinent to patient care.     Follow up:    RTC:  -6 months with labs     Medications:  -N/A    Imaging/Testing:  -N/A    Referral:  -Gynecology per patient request    Other Pertinent Appointments:  -Family medicine 9/26/2024      Patient Discussion Summary:  Discussed plan of care in detail. Patient states understanding and in agreement. Answered all questions. They will call with any additional questions and/or concerns.    Thank you for allowing me to participate in your care. It was a pleasure meeting you.    Sincerely,  Melba Rivera, APRN-CNP       This document may have been written by voice recognition software.  Please request clarification if there is documentation error or clarification is needed.   Time based billing: Please see documentation within this specific encounter.

## 2024-11-27 ENCOUNTER — TELEPHONE (OUTPATIENT)
Dept: HEMATOLOGY/ONCOLOGY | Facility: HOSPITAL | Age: 69
End: 2024-11-27
Payer: MEDICARE

## 2024-11-27 NOTE — TELEPHONE ENCOUNTER
Reached out to patient regarding R. Mihic leaving. Transferred care to Dr. Cutler. Patient verbalized and agreed.

## 2025-03-11 ENCOUNTER — APPOINTMENT (OUTPATIENT)
Dept: HEMATOLOGY/ONCOLOGY | Facility: HOSPITAL | Age: 70
End: 2025-03-11
Payer: MEDICARE

## 2025-03-12 ENCOUNTER — LAB (OUTPATIENT)
Dept: LAB | Facility: HOSPITAL | Age: 70
End: 2025-03-12
Payer: MEDICARE

## 2025-03-12 ENCOUNTER — OFFICE VISIT (OUTPATIENT)
Dept: HEMATOLOGY/ONCOLOGY | Facility: HOSPITAL | Age: 70
End: 2025-03-12
Payer: MEDICARE

## 2025-03-12 VITALS
SYSTOLIC BLOOD PRESSURE: 116 MMHG | DIASTOLIC BLOOD PRESSURE: 75 MMHG | BODY MASS INDEX: 26.08 KG/M2 | WEIGHT: 176.59 LBS | HEART RATE: 87 BPM | RESPIRATION RATE: 18 BRPM | OXYGEN SATURATION: 97 % | TEMPERATURE: 96.6 F

## 2025-03-12 DIAGNOSIS — R53.83 OTHER FATIGUE: ICD-10-CM

## 2025-03-12 DIAGNOSIS — E61.1 IRON DEFICIENCY: Primary | ICD-10-CM

## 2025-03-12 DIAGNOSIS — E61.1 IRON DEFICIENCY: ICD-10-CM

## 2025-03-12 DIAGNOSIS — D72.819 LEUKOPENIA, UNSPECIFIED TYPE: ICD-10-CM

## 2025-03-12 LAB
ALBUMIN SERPL BCP-MCNC: 4.4 G/DL (ref 3.4–5)
ALP SERPL-CCNC: 67 U/L (ref 33–136)
ALT SERPL W P-5'-P-CCNC: 12 U/L (ref 7–45)
ANION GAP SERPL CALC-SCNC: 11 MMOL/L (ref 10–20)
AST SERPL W P-5'-P-CCNC: 16 U/L (ref 9–39)
BASOPHILS # BLD AUTO: 0.03 X10*3/UL (ref 0–0.1)
BASOPHILS NFR BLD AUTO: 0.7 %
BILIRUB SERPL-MCNC: 0.5 MG/DL (ref 0–1.2)
BUN SERPL-MCNC: 19 MG/DL (ref 6–23)
CALCIUM SERPL-MCNC: 9.8 MG/DL (ref 8.6–10.3)
CHLORIDE SERPL-SCNC: 99 MMOL/L (ref 98–107)
CO2 SERPL-SCNC: 32 MMOL/L (ref 21–32)
CREAT SERPL-MCNC: 0.94 MG/DL (ref 0.5–1.05)
EGFRCR SERPLBLD CKD-EPI 2021: 65 ML/MIN/1.73M*2
EOSINOPHIL # BLD AUTO: 0.11 X10*3/UL (ref 0–0.7)
EOSINOPHIL NFR BLD AUTO: 2.5 %
ERYTHROCYTE [DISTWIDTH] IN BLOOD BY AUTOMATED COUNT: 12.2 % (ref 11.5–14.5)
ERYTHROCYTE [SEDIMENTATION RATE] IN BLOOD BY WESTERGREN METHOD: 4 MM/H (ref 0–30)
FERRITIN SERPL-MCNC: 218 NG/ML (ref 8–150)
GLUCOSE SERPL-MCNC: 97 MG/DL (ref 74–99)
HCT VFR BLD AUTO: 44 % (ref 36–46)
HGB BLD-MCNC: 14.3 G/DL (ref 12–16)
IMM GRANULOCYTES # BLD AUTO: 0.01 X10*3/UL (ref 0–0.7)
IMM GRANULOCYTES NFR BLD AUTO: 0.2 % (ref 0–0.9)
IRON SATN MFR SERPL: 19 % (ref 25–45)
IRON SERPL-MCNC: 57 UG/DL (ref 35–150)
LDH SERPL L TO P-CCNC: 180 U/L (ref 84–246)
LYMPHOCYTES # BLD AUTO: 0.95 X10*3/UL (ref 1.2–4.8)
LYMPHOCYTES NFR BLD AUTO: 21.9 %
MCH RBC QN AUTO: 28.3 PG (ref 26–34)
MCHC RBC AUTO-ENTMCNC: 32.5 G/DL (ref 32–36)
MCV RBC AUTO: 87 FL (ref 80–100)
MONOCYTES # BLD AUTO: 0.35 X10*3/UL (ref 0.1–1)
MONOCYTES NFR BLD AUTO: 8.1 %
NEUTROPHILS # BLD AUTO: 2.89 X10*3/UL (ref 1.2–7.7)
NEUTROPHILS NFR BLD AUTO: 66.6 %
NRBC BLD-RTO: 0 /100 WBCS (ref 0–0)
PLATELET # BLD AUTO: 198 X10*3/UL (ref 150–450)
POTASSIUM SERPL-SCNC: 4.5 MMOL/L (ref 3.5–5.3)
PROT SERPL-MCNC: 7.3 G/DL (ref 6.4–8.2)
RBC # BLD AUTO: 5.06 X10*6/UL (ref 4–5.2)
SODIUM SERPL-SCNC: 137 MMOL/L (ref 136–145)
TIBC SERPL-MCNC: 306 UG/DL (ref 240–445)
TSH SERPL-ACNC: 1.29 MIU/L (ref 0.44–3.98)
UIBC SERPL-MCNC: 249 UG/DL (ref 110–370)
WBC # BLD AUTO: 4.3 X10*3/UL (ref 4.4–11.3)

## 2025-03-12 PROCEDURE — 80053 COMPREHEN METABOLIC PANEL: CPT

## 2025-03-12 PROCEDURE — 82607 VITAMIN B-12: CPT

## 2025-03-12 PROCEDURE — 83540 ASSAY OF IRON: CPT

## 2025-03-12 PROCEDURE — 82746 ASSAY OF FOLIC ACID SERUM: CPT

## 2025-03-12 PROCEDURE — 85652 RBC SED RATE AUTOMATED: CPT

## 2025-03-12 PROCEDURE — 99215 OFFICE O/P EST HI 40 MIN: CPT | Performed by: INTERNAL MEDICINE

## 2025-03-12 PROCEDURE — 85025 COMPLETE CBC W/AUTO DIFF WBC: CPT

## 2025-03-12 PROCEDURE — 82728 ASSAY OF FERRITIN: CPT

## 2025-03-12 PROCEDURE — 1125F AMNT PAIN NOTED PAIN PRSNT: CPT | Performed by: INTERNAL MEDICINE

## 2025-03-12 PROCEDURE — 83615 LACTATE (LD) (LDH) ENZYME: CPT

## 2025-03-12 PROCEDURE — 83550 IRON BINDING TEST: CPT

## 2025-03-12 PROCEDURE — 84443 ASSAY THYROID STIM HORMONE: CPT

## 2025-03-12 RX ORDER — DULOXETIN HYDROCHLORIDE 30 MG/1
30 CAPSULE, DELAYED RELEASE ORAL DAILY
COMMUNITY

## 2025-03-12 ASSESSMENT — COLUMBIA-SUICIDE SEVERITY RATING SCALE - C-SSRS
6. HAVE YOU EVER DONE ANYTHING, STARTED TO DO ANYTHING, OR PREPARED TO DO ANYTHING TO END YOUR LIFE?: NO
2. HAVE YOU ACTUALLY HAD ANY THOUGHTS OF KILLING YOURSELF?: NO
1. IN THE PAST MONTH, HAVE YOU WISHED YOU WERE DEAD OR WISHED YOU COULD GO TO SLEEP AND NOT WAKE UP?: NO

## 2025-03-12 ASSESSMENT — ENCOUNTER SYMPTOMS
GASTROINTESTINAL NEGATIVE: 1
CARDIOVASCULAR NEGATIVE: 1
DEPRESSION: 0
LOSS OF SENSATION IN FEET: 0
OCCASIONAL FEELINGS OF UNSTEADINESS: 0
RESPIRATORY NEGATIVE: 1
CONSTITUTIONAL NEGATIVE: 1

## 2025-03-12 ASSESSMENT — PAIN SCALES - GENERAL: PAINLEVEL_OUTOF10: 2

## 2025-03-12 ASSESSMENT — PATIENT HEALTH QUESTIONNAIRE - PHQ9
SUM OF ALL RESPONSES TO PHQ9 QUESTIONS 1 & 2: 0
2. FEELING DOWN, DEPRESSED OR HOPELESS: NOT AT ALL
1. LITTLE INTEREST OR PLEASURE IN DOING THINGS: NOT AT ALL

## 2025-03-12 NOTE — PROGRESS NOTES
Patient ID: Paulette Amin is a 69 y.o. female.    Subjective:  Returns for follow up for leukopenia.   Denies having new complaints. No chest/abdominal pain.   Had flu like symptoms without fever. No bleeding.     Assessment/Plan:  ? Leukopenia: She had been followed at our clinic since 2022 with leukopenia. This is mild and chronic. In Sep 2024, it was 4.3. Vitamin indices are OK. No HSM or LAPs. No sign/symptom to suggest an underlyingh hematological disorder.     Will repeat ;labs today. If stable, she would not require further follow up. She knows she can call us should she require services in future.     Review Of Systems:  Review of Systems   Constitutional: Negative.    HENT:  Negative.     Respiratory: Negative.     Cardiovascular: Negative.    Gastrointestinal: Negative.        Physical Exam:  /75 (BP Location: Right arm, Patient Position: Sitting)   Pulse 87   Temp 35.9 °C (96.6 °F) (Temporal)   Resp 18   Wt 80.1 kg (176 lb 9.4 oz)   SpO2 97%   BMI 26.08 kg/m²   BSA: 1.97 meters squared  Performance Status: Asymptomatic  Physical Exam  Constitutional:       Appearance: Normal appearance.   HENT:      Head: Normocephalic and atraumatic.   Eyes:      Pupils: Pupils are equal, round, and reactive to light.   Cardiovascular:      Rate and Rhythm: Normal rate and regular rhythm.   Pulmonary:      Effort: Pulmonary effort is normal.   Abdominal:      General: Abdomen is flat.      Palpations: Abdomen is soft. There is no mass.   Musculoskeletal:      Right lower leg: No edema.      Left lower leg: No edema.   Lymphadenopathy:      Cervical: No cervical adenopathy.   Skin:     Coloration: Skin is not jaundiced.   Neurological:      General: No focal deficit present.      Mental Status: She is alert and oriented to person, place, and time.         Results:  Diagnostic Results   Lab Results   Component Value Date    WBC 4.3 (L) 09/17/2024    HGB 14.2 09/17/2024    HCT 44.0 09/17/2024    MCV 88  09/17/2024     09/17/2024     Lab Results   Component Value Date    CALCIUM 9.5 09/17/2024     09/17/2024    K 4.5 09/17/2024    CO2 31 09/17/2024     09/17/2024    BUN 16 09/17/2024    CREATININE 0.86 09/17/2024    ALT 9 09/17/2024    AST 14 09/17/2024       Current Outpatient Medications:     ammonium lactate (Lac-Hydrin) 12 % lotion, Apply topically if needed for dry skin., Disp: , Rfl:     ascorbic acid (Vitamin C) 500 mg tablet, Take 1 tablet (500 mg) by mouth every other day., Disp: , Rfl:     cholecalciferol (Vitamin D-3) 50,000 unit capsule, Take 1 capsule (50,000 Units) by mouth 1 (one) time per week., Disp: , Rfl:     cyanocobalamin (Vitamin B-12) 1,000 mcg tablet, Take 1 tablet (1,000 mcg) by mouth once daily., Disp: , Rfl:     DULoxetine (Cymbalta) 30 mg DR capsule, Take 1 capsule (30 mg) by mouth once daily. Do not crush or chew., Disp: , Rfl:     ferrous sulfate, 325 mg ferrous sulfate, tablet, Take 1 tablet (325 mg) by mouth every other day., Disp: , Rfl:     levothyroxine (Synthroid, Levoxyl) 25 mcg tablet, Take 1 tablet (25 mcg) by mouth once daily in the morning. Take before meals., Disp: , Rfl:     magnesium oxide (Mag-Ox) 400 mg tablet, 1 tablet (400 mg) once daily., Disp: , Rfl:     risankizumab-rzaa (SKYRIZI SUBQ), Inject under the skin every 3 months., Disp: , Rfl:     b complex 0.4 mg tablet, Take 1 tablet by mouth once daily. (Patient not taking: Reported on 3/12/2025), Disp: , Rfl:      Past Surgical History:   Procedure Laterality Date    COLONOSCOPY  08/08/2016    Complete Colonoscopy    OTHER SURGICAL HISTORY  08/08/2016    Cervix Cryosurgery     No family history on file.   has no history on file for tobacco use.    Diagnoses and all orders for this visit:  Iron deficiency  -     Clinic Appointment Request  -     CBC and Auto Differential; Future  -     Comprehensive Metabolic Panel; Future  -     Sedimentation Rate; Future  -     Lactate Dehydrogenase; Future  -      Vitamin B12; Future  -     Folate; Future  -     Ferritin; Future  -     Iron and TIBC; Future  -     Thyroid Stimulating Hormone; Future  Leukopenia, unspecified type  -     Clinic Appointment Request  -     CBC and Auto Differential; Future  -     Comprehensive Metabolic Panel; Future  -     Sedimentation Rate; Future  -     Lactate Dehydrogenase; Future  -     Vitamin B12; Future  -     Folate; Future  -     Ferritin; Future  -     Iron and TIBC; Future  -     Thyroid Stimulating Hormone; Future  Other fatigue  -     Thyroid Stimulating Hormone; Future       Derian Cutler MD

## 2025-03-13 LAB
FOLATE SERPL-MCNC: 12 NG/ML
VIT B12 SERPL-MCNC: 687 PG/ML (ref 211–911)